# Patient Record
Sex: FEMALE | Race: WHITE
[De-identification: names, ages, dates, MRNs, and addresses within clinical notes are randomized per-mention and may not be internally consistent; named-entity substitution may affect disease eponyms.]

---

## 2018-07-12 ENCOUNTER — RESULT REVIEW (OUTPATIENT)
Age: 23
End: 2018-07-12

## 2018-12-13 PROBLEM — Z00.00 ENCOUNTER FOR PREVENTIVE HEALTH EXAMINATION: Status: ACTIVE | Noted: 2018-12-13

## 2019-07-11 ENCOUNTER — RX RENEWAL (OUTPATIENT)
Age: 24
End: 2019-07-11

## 2019-07-23 ENCOUNTER — APPOINTMENT (OUTPATIENT)
Dept: OBGYN | Facility: CLINIC | Age: 24
End: 2019-07-23

## 2020-02-23 ENCOUNTER — INPATIENT (INPATIENT)
Facility: HOSPITAL | Age: 25
LOS: 3 days | Discharge: HOME | DRG: 386 | End: 2020-02-27
Attending: INTERNAL MEDICINE | Admitting: INTERNAL MEDICINE
Payer: COMMERCIAL

## 2020-02-23 VITALS
OXYGEN SATURATION: 100 % | RESPIRATION RATE: 18 BRPM | HEART RATE: 97 BPM | SYSTOLIC BLOOD PRESSURE: 125 MMHG | WEIGHT: 125 LBS | HEIGHT: 64 IN | DIASTOLIC BLOOD PRESSURE: 82 MMHG | TEMPERATURE: 98 F

## 2020-02-23 DIAGNOSIS — K51.00 ULCERATIVE (CHRONIC) PANCOLITIS WITHOUT COMPLICATIONS: ICD-10-CM

## 2020-02-23 DIAGNOSIS — K62.5 HEMORRHAGE OF ANUS AND RECTUM: ICD-10-CM

## 2020-02-23 DIAGNOSIS — N39.0 URINARY TRACT INFECTION, SITE NOT SPECIFIED: ICD-10-CM

## 2020-02-23 DIAGNOSIS — Z29.9 ENCOUNTER FOR PROPHYLACTIC MEASURES, UNSPECIFIED: ICD-10-CM

## 2020-02-23 DIAGNOSIS — Z98.890 OTHER SPECIFIED POSTPROCEDURAL STATES: Chronic | ICD-10-CM

## 2020-02-23 LAB
ALBUMIN SERPL ELPH-MCNC: 4.1 G/DL — SIGNIFICANT CHANGE UP (ref 3.3–5)
ALP SERPL-CCNC: 65 U/L — SIGNIFICANT CHANGE UP (ref 40–120)
ALT FLD-CCNC: 15 U/L — SIGNIFICANT CHANGE UP (ref 10–45)
ANION GAP SERPL CALC-SCNC: 14 MMOL/L — SIGNIFICANT CHANGE UP (ref 5–17)
APPEARANCE UR: ABNORMAL
APTT BLD: 24.5 SEC — LOW (ref 27.5–36.3)
AST SERPL-CCNC: 13 U/L — SIGNIFICANT CHANGE UP (ref 10–40)
BACTERIA # UR AUTO: NEGATIVE — SIGNIFICANT CHANGE UP
BASOPHILS # BLD AUTO: 0.05 K/UL — SIGNIFICANT CHANGE UP (ref 0–0.2)
BASOPHILS NFR BLD AUTO: 0.7 % — SIGNIFICANT CHANGE UP (ref 0–2)
BILIRUB SERPL-MCNC: 0.2 MG/DL — SIGNIFICANT CHANGE UP (ref 0.2–1.2)
BILIRUB UR-MCNC: NEGATIVE — SIGNIFICANT CHANGE UP
BLD GP AB SCN SERPL QL: NEGATIVE — SIGNIFICANT CHANGE UP
BUN SERPL-MCNC: 12 MG/DL — SIGNIFICANT CHANGE UP (ref 7–23)
CALCIUM SERPL-MCNC: 9.3 MG/DL — SIGNIFICANT CHANGE UP (ref 8.4–10.5)
CHLORIDE SERPL-SCNC: 104 MMOL/L — SIGNIFICANT CHANGE UP (ref 96–108)
CO2 SERPL-SCNC: 22 MMOL/L — SIGNIFICANT CHANGE UP (ref 22–31)
COLOR SPEC: YELLOW — SIGNIFICANT CHANGE UP
CREAT SERPL-MCNC: 0.86 MG/DL — SIGNIFICANT CHANGE UP (ref 0.5–1.3)
DIFF PNL FLD: ABNORMAL
EOSINOPHIL # BLD AUTO: 0.59 K/UL — HIGH (ref 0–0.5)
EOSINOPHIL NFR BLD AUTO: 8.6 % — HIGH (ref 0–6)
EPI CELLS # UR: 7 — SIGNIFICANT CHANGE UP
GLUCOSE SERPL-MCNC: 99 MG/DL — SIGNIFICANT CHANGE UP (ref 70–99)
GLUCOSE UR QL: NEGATIVE — SIGNIFICANT CHANGE UP
HCG SERPL-ACNC: <2 MIU/ML — SIGNIFICANT CHANGE UP
HCT VFR BLD CALC: 43.3 % — SIGNIFICANT CHANGE UP (ref 34.5–45)
HGB BLD-MCNC: 14.3 G/DL — SIGNIFICANT CHANGE UP (ref 11.5–15.5)
HYALINE CASTS # UR AUTO: 1 /LPF — SIGNIFICANT CHANGE UP (ref 0–7)
IMM GRANULOCYTES NFR BLD AUTO: 0.1 % — SIGNIFICANT CHANGE UP (ref 0–1.5)
INR BLD: 1.08 RATIO — SIGNIFICANT CHANGE UP (ref 0.88–1.16)
KETONES UR-MCNC: NEGATIVE — SIGNIFICANT CHANGE UP
LEUKOCYTE ESTERASE UR-ACNC: NEGATIVE — SIGNIFICANT CHANGE UP
LIDOCAIN IGE QN: 276 U/L — HIGH (ref 7–60)
LYMPHOCYTES # BLD AUTO: 2.04 K/UL — SIGNIFICANT CHANGE UP (ref 1–3.3)
LYMPHOCYTES # BLD AUTO: 29.7 % — SIGNIFICANT CHANGE UP (ref 13–44)
MCHC RBC-ENTMCNC: 29.5 PG — SIGNIFICANT CHANGE UP (ref 27–34)
MCHC RBC-ENTMCNC: 33 GM/DL — SIGNIFICANT CHANGE UP (ref 32–36)
MCV RBC AUTO: 89.5 FL — SIGNIFICANT CHANGE UP (ref 80–100)
MONOCYTES # BLD AUTO: 0.97 K/UL — HIGH (ref 0–0.9)
MONOCYTES NFR BLD AUTO: 14.1 % — HIGH (ref 2–14)
NEUTROPHILS # BLD AUTO: 3.21 K/UL — SIGNIFICANT CHANGE UP (ref 1.8–7.4)
NEUTROPHILS NFR BLD AUTO: 46.8 % — SIGNIFICANT CHANGE UP (ref 43–77)
NITRITE UR-MCNC: NEGATIVE — SIGNIFICANT CHANGE UP
NRBC # BLD: 0 /100 WBCS — SIGNIFICANT CHANGE UP (ref 0–0)
PH UR: 6 — SIGNIFICANT CHANGE UP (ref 5–8)
PLATELET # BLD AUTO: 276 K/UL — SIGNIFICANT CHANGE UP (ref 150–400)
POTASSIUM SERPL-MCNC: 3.8 MMOL/L — SIGNIFICANT CHANGE UP (ref 3.5–5.3)
POTASSIUM SERPL-SCNC: 3.8 MMOL/L — SIGNIFICANT CHANGE UP (ref 3.5–5.3)
PROT SERPL-MCNC: 7.2 G/DL — SIGNIFICANT CHANGE UP (ref 6–8.3)
PROT UR-MCNC: ABNORMAL
PROTHROM AB SERPL-ACNC: 12.4 SEC — SIGNIFICANT CHANGE UP (ref 10–12.9)
RBC # BLD: 4.84 M/UL — SIGNIFICANT CHANGE UP (ref 3.8–5.2)
RBC # FLD: 12.8 % — SIGNIFICANT CHANGE UP (ref 10.3–14.5)
RBC CASTS # UR COMP ASSIST: 2 /HPF — SIGNIFICANT CHANGE UP (ref 0–4)
RH IG SCN BLD-IMP: POSITIVE — SIGNIFICANT CHANGE UP
SODIUM SERPL-SCNC: 140 MMOL/L — SIGNIFICANT CHANGE UP (ref 135–145)
SP GR SPEC: 1.03 — HIGH (ref 1.01–1.02)
UROBILINOGEN FLD QL: NEGATIVE — SIGNIFICANT CHANGE UP
WBC # BLD: 6.87 K/UL — SIGNIFICANT CHANGE UP (ref 3.8–10.5)
WBC # FLD AUTO: 6.87 K/UL — SIGNIFICANT CHANGE UP (ref 3.8–10.5)
WBC UR QL: 1 /HPF — SIGNIFICANT CHANGE UP (ref 0–5)

## 2020-02-23 PROCEDURE — 74177 CT ABD & PELVIS W/CONTRAST: CPT | Mod: 26

## 2020-02-23 PROCEDURE — 99285 EMERGENCY DEPT VISIT HI MDM: CPT

## 2020-02-23 PROCEDURE — 99223 1ST HOSP IP/OBS HIGH 75: CPT

## 2020-02-23 RX ORDER — SODIUM CHLORIDE 9 MG/ML
1000 INJECTION INTRAMUSCULAR; INTRAVENOUS; SUBCUTANEOUS ONCE
Refills: 0 | Status: COMPLETED | OUTPATIENT
Start: 2020-02-23 | End: 2020-02-23

## 2020-02-23 RX ORDER — CIPROFLOXACIN LACTATE 400MG/40ML
400 VIAL (ML) INTRAVENOUS ONCE
Refills: 0 | Status: COMPLETED | OUTPATIENT
Start: 2020-02-23 | End: 2020-02-23

## 2020-02-23 RX ORDER — METRONIDAZOLE 500 MG
500 TABLET ORAL ONCE
Refills: 0 | Status: COMPLETED | OUTPATIENT
Start: 2020-02-23 | End: 2020-02-24

## 2020-02-23 RX ORDER — CIPROFLOXACIN LACTATE 400MG/40ML
VIAL (ML) INTRAVENOUS
Refills: 0 | Status: DISCONTINUED | OUTPATIENT
Start: 2020-02-23 | End: 2020-02-24

## 2020-02-23 RX ORDER — CIPROFLOXACIN LACTATE 400MG/40ML
400 VIAL (ML) INTRAVENOUS EVERY 12 HOURS
Refills: 0 | Status: DISCONTINUED | OUTPATIENT
Start: 2020-02-24 | End: 2020-02-24

## 2020-02-23 RX ORDER — SODIUM CHLORIDE 9 MG/ML
1000 INJECTION INTRAMUSCULAR; INTRAVENOUS; SUBCUTANEOUS
Refills: 0 | Status: DISCONTINUED | OUTPATIENT
Start: 2020-02-23 | End: 2020-02-25

## 2020-02-23 RX ORDER — METRONIDAZOLE 500 MG
500 TABLET ORAL EVERY 8 HOURS
Refills: 0 | Status: DISCONTINUED | OUTPATIENT
Start: 2020-02-24 | End: 2020-02-24

## 2020-02-23 RX ORDER — METRONIDAZOLE 500 MG
TABLET ORAL
Refills: 0 | Status: DISCONTINUED | OUTPATIENT
Start: 2020-02-24 | End: 2020-02-24

## 2020-02-23 RX ADMIN — Medication 200 MILLIGRAM(S): at 22:57

## 2020-02-23 RX ADMIN — SODIUM CHLORIDE 1000 MILLILITER(S): 9 INJECTION INTRAMUSCULAR; INTRAVENOUS; SUBCUTANEOUS at 20:19

## 2020-02-23 RX ADMIN — SODIUM CHLORIDE 1000 MILLILITER(S): 9 INJECTION INTRAMUSCULAR; INTRAVENOUS; SUBCUTANEOUS at 20:18

## 2020-02-23 RX ADMIN — SODIUM CHLORIDE 1000 MILLILITER(S): 9 INJECTION INTRAMUSCULAR; INTRAVENOUS; SUBCUTANEOUS at 17:36

## 2020-02-23 RX ADMIN — SODIUM CHLORIDE 100 MILLILITER(S): 9 INJECTION INTRAMUSCULAR; INTRAVENOUS; SUBCUTANEOUS at 22:58

## 2020-02-23 RX ADMIN — SODIUM CHLORIDE 1000 MILLILITER(S): 9 INJECTION INTRAMUSCULAR; INTRAVENOUS; SUBCUTANEOUS at 21:48

## 2020-02-23 NOTE — ED PROVIDER NOTE - OBJECTIVE STATEMENT
24 year old female with no pmhx presents to the ED c/o rectal bleeding x1 month worsening over the past week with accompanying abdominal pain. +weakness, chills. Fmhx UC, pt referred to [facility] today for evaluation s/p referral by Dr. Campbell and Dr. Frost to determine whether she has UC. Notes increased frequency of BM over the past several weeks. Also notes she has recurrent UTIs and suspects that she may also have a UTI currently. Denies fever, rash. Currently on birth control.

## 2020-02-23 NOTE — H&P ADULT - NSHPREVIEWOFSYSTEMS_GEN_ALL_CORE
CONSTITUTIONAL: No weakness, fevers or chills  EYES/ENT: No visual changes;  No dysphagia  NECK: No pain or stiffness  RESPIRATORY: No cough, wheezing, hemoptysis; No shortness of breath  CARDIOVASCULAR: No chest pain or palpitations; No lower extremity edema  EXTREMITIES: no le edema, cyanosis, clubbing  MUSCULOSKELETAL: no joint pain, swelling  GASTROINTESTINAL: +abdominal pain, +bloody diarrhea  BACK: No back pain  GENITOURINARY: +dysuria  NEUROLOGICAL: No numbness or weakness  SKIN: No itching, burning, rashes, or lesions   PSYCH: no agitation  All other review of systems is negative unless indicated above.

## 2020-02-23 NOTE — H&P ADULT - NSICDXFAMILYHX_GEN_ALL_CORE_FT
FAMILY HISTORY:  Family history of ulcerative colitis, father and paternal grand mother  FH: heart disease, father

## 2020-02-23 NOTE — ED PROVIDER NOTE - PROGRESS NOTE DETAILS
Spoke with Dr Campbell. To be seen by Dr Rodriguez. Callback once CT and Labs result Patient signed out to me by the prior attending.  The patient's disposition was discussed with the treating team and agreed upon.  Last Shannon M.D. (attending) patient with pancolitis will admit to inpatient  Dr. Campbell received call at this time and accepts patient  Based on patient's history and physical exam, as well as the results of today's workup, I feel that patient warrants admission to the hospital for further workup/evaluation and continued management. I discussed the findings of today's workup with the patient and addressed the patient's questions and concerns. The patient was agreeable with admission. Our team spoke with the inpatient receiving team who accepted the patient for admission and subsequently took over the patient's care at the time of admission.

## 2020-02-23 NOTE — ED PROVIDER NOTE - ATTENDING CONTRIBUTION TO CARE
Bety Avelar MD - Attending Physician: I have personally seen and examined this patient with the resident/fellow.  I have fully participated in the care of this patient. I have reviewed all pertinent clinical information, including history, physical exam, plan and the Resident/Fellow’s note and agree except as noted. See MDM

## 2020-02-23 NOTE — H&P ADULT - HISTORY OF PRESENT ILLNESS
24 year old female with no pmhx presents to the ED c/o rectal bleeding x1 month worsening over the past week with accompanying abdominal pain. +weakness, chills. Fmhx UC, pt referred to [facility] today for evaluation s/p referral by Dr. Campbell and Dr. Frost to determine whether she has UC. Notes increased frequency of BM over the past several weeks. Also notes she has recurrent UTIs and suspects that she may also have a UTI currently. Denies fever, rash. Currently on birth control. 23 yo female with PMH of osteomyelitis of left ankle, presents here with one month of rectal bleeding.  Patient states about 1 month ago, patient started having dark stook and 2 weeks ago started having BRBPR.  Patient states she has been having multiple episodes of bloody stool, recently she has been having fecal urgency and running to bathroom every hour, on average having >10 episodes of bloody stool per day.  She also has associated with abdominal pain but denies fever, chills, nausea, vomiting.  She has been feeling hot flushes.  She is also currently being treated for UTI with nitrofurontoin (completed 3 days).  Patient's father and paternal grandmother were diagnosed with ulcerative colitis. 25 yo female with PMH of osteomyelitis (?MRSA) of left ankle, presents here with one month of rectal bleeding.  Patient states about 1 month ago, patient started having dark stook and 2 weeks ago started having BRBPR.  Patient states she has been having multiple episodes of bloody stool, recently she has been having fecal urgency and running to bathroom every hour, on average having >10 episodes of bloody stool per day.  She also has associated with abdominal pain but denies fever, chills, nausea, vomiting.  She has been feeling hot flushes.  She is also currently being treated for UTI with nitrofurontoin (completed 3 days).  Patient's father and paternal grandmother were diagnosed with ulcerative colitis.

## 2020-02-23 NOTE — H&P ADULT - PROBLEM SELECTOR PLAN 2
Given evidence of pancolitis, >10 episodes of diarrhea and fecal urgency, will start patient on IV abx for now  check stool culture, ova, parasites, c. diff, GI PCR  keep NPO for now  check lactate  GI consult in AM

## 2020-02-23 NOTE — ED PROVIDER NOTE - PHYSICAL EXAMINATION
General: No acute distress.  Heart: Regular rate and rhythm. No murmurs, rubs, or gallops.  Lungs: CTAB, no wheezes or rhonchi.  Abdomen: Mild non-specific tenderness, no rebound or distension.   Eyes: PERRL, EOMI.  Neuro: AAOx4, no focal motor or sensory deficits. CN II-XII intact.  Extremities: No lower extremity swelling, 2+ DP and radial pulses.  Skin: No rashes or lesions.

## 2020-02-23 NOTE — H&P ADULT - NSHPLABSRESULTS_GEN_ALL_CORE
Labs personally reviewed:                          14.3   6.87  )-----------( 276      ( 2020 17:41 )             43.3     -    140  |  104  |  12  ----------------------------<  99  3.8   |  22  |  0.86    Ca    9.3      2020 17:41    TPro  7.2  /  Alb  4.1  /  TBili  0.2  /  DBili  x   /  AST  13  /  ALT  15  /  AlkPhos  65  -        LIVER FUNCTIONS - ( 2020 17:41 )  Alb: 4.1 g/dL / Pro: 7.2 g/dL / ALK PHOS: 65 U/L / ALT: 15 U/L / AST: 13 U/L / GGT: x           PT/INR - ( 2020 17:41 )   PT: 12.4 sec;   INR: 1.08 ratio         PTT - ( 2020 17:41 )  PTT:24.5 sec  Urinalysis Basic - ( 2020 17:41 )    Color: Yellow / Appearance: Slightly Turbid / S.032 / pH: x  Gluc: x / Ketone: Negative  / Bili: Negative / Urobili: Negative   Blood: x / Protein: 30 mg/dL / Nitrite: Negative   Leuk Esterase: Negative / RBC: 2 /hpf / WBC 1 /HPF   Sq Epi: x / Non Sq Epi: 7 / Bacteria: Negative      CAPILLARY BLOOD GLUCOSE          Imaging:  CT abd/pelv reviewed:  IMPRESSION:     Diffuse pancolitis of infectious or inflammatory etiology.  No bowel obstruction.      EKG ordered

## 2020-02-23 NOTE — H&P ADULT - PROBLEM SELECTOR PLAN 1
Patient with rectal bleeding >10 episodes/day  hemodynamically stable  Hb stable  c/w IVF  monitor H+H  keep NPO for now  GI consult in am  stool work up

## 2020-02-23 NOTE — ED PROVIDER NOTE - CLINICAL SUMMARY MEDICAL DECISION MAKING FREE TEXT BOX
24 year old female with no pmhx presents to the ED c/o rectal bleeding, recently developed abdominal pain. Likely suspect UC given fmhx. Will conduct labs, imaging, GI consult then likely admit. Bety Avelar MD - Attending Physician: 24 year old female with no pmhx presents to the ED c/o rectal bleeding, recently developed abdominal pain. Likely suspect UC given fmhx. Will conduct labs, imaging, GI consult then likely admit.

## 2020-02-23 NOTE — H&P ADULT - ASSESSMENT
25 yo female with PMH of osteomyelitis (?MRSA) of left ankle, presents here with one month of rectal bleeding

## 2020-02-23 NOTE — ED PROVIDER NOTE - NS_ ATTENDINGSCRIBEDETAILS _ED_A_ED_FT
Bety Avelar MD - Attending Physician: The scribe's documentation has been prepared under my direction and personally reviewed by me in its entirety. I confirm that the note above accurately reflects all work, treatment, procedures, and medical decision making performed by me.

## 2020-02-23 NOTE — H&P ADULT - PROBLEM SELECTOR PLAN 3
completed 3 days of nitrofurontoin, currently still symptomatic  on cipro for colitis, which will continue

## 2020-02-23 NOTE — H&P ADULT - NSHPPHYSICALEXAM_GEN_ALL_CORE
PHYSICAL EXAM:  Vital Signs Last 24 Hrs  T(C): 37.1 (02-23-20 @ 20:15)  T(F): 98.8 (02-23-20 @ 20:15), Max: 98.8 (02-23-20 @ 20:15)  HR: 85 (02-23-20 @ 20:15) (85 - 97)  BP: 116/75 (02-23-20 @ 20:15)  BP(mean): --  RR: 16 (02-23-20 @ 20:15) (16 - 18)  SpO2: 98% (02-23-20 @ 20:15) (98% - 100%)  Wt(kg): --    Constitutional: NAD, awake and alert  EYES: EOMI  ENMT:  Normal Hearing, no tonsillar exudates ; dry mucous membrane  Neck: Soft and supple, No JVD  Lungs: Breath sounds are clear bilaterally, No wheezing, rales or rhonchi  Heart: S1 and S2, regular rate and rhythm, no Murmurs, gallops or rubs  Abdomen: Bowel Sounds present, soft, nondistended, +voluntary guarding, +tenderness diffusely  Extremities: No cyanosis or clubbing; warm to touch  Vascular: 2+ peripheral pulses lower ex  Neurological: A/O x 3, no focal deficits  Musculoskeletal: 5/5 strength b/l upper and lower extremities  Skin: No rashes  Psych: no depression or anhedonia  HEME: no bruises, no nose bleeds

## 2020-02-24 ENCOUNTER — RESULT REVIEW (OUTPATIENT)
Age: 25
End: 2020-02-24

## 2020-02-24 LAB
C DIFF GDH STL QL: NEGATIVE — SIGNIFICANT CHANGE UP
C DIFF GDH STL QL: SIGNIFICANT CHANGE UP
CRP SERPL-MCNC: 1.2 MG/DL — HIGH (ref 0–0.4)
CULTURE RESULTS: SIGNIFICANT CHANGE UP
ERYTHROCYTE [SEDIMENTATION RATE] IN BLOOD: 7 MM/HR — SIGNIFICANT CHANGE UP (ref 0–15)
HCT VFR BLD CALC: 29.7 % — LOW (ref 34.5–45)
HCT VFR BLD CALC: 36.7 % — SIGNIFICANT CHANGE UP (ref 34.5–45)
HCT VFR BLD CALC: 38.9 % — SIGNIFICANT CHANGE UP (ref 34.5–45)
HGB BLD-MCNC: 11.8 G/DL — SIGNIFICANT CHANGE UP (ref 11.5–15.5)
HGB BLD-MCNC: 12.7 G/DL — SIGNIFICANT CHANGE UP (ref 11.5–15.5)
HGB BLD-MCNC: 9.4 G/DL — LOW (ref 11.5–15.5)
LACTATE BLDV-MCNC: 1.8 MMOL/L — SIGNIFICANT CHANGE UP (ref 0.7–2)
MCHC RBC-ENTMCNC: 29 PG — SIGNIFICANT CHANGE UP (ref 27–34)
MCHC RBC-ENTMCNC: 29.1 PG — SIGNIFICANT CHANGE UP (ref 27–34)
MCHC RBC-ENTMCNC: 29.6 PG — SIGNIFICANT CHANGE UP (ref 27–34)
MCHC RBC-ENTMCNC: 31.6 GM/DL — LOW (ref 32–36)
MCHC RBC-ENTMCNC: 32.2 GM/DL — SIGNIFICANT CHANGE UP (ref 32–36)
MCHC RBC-ENTMCNC: 32.6 GM/DL — SIGNIFICANT CHANGE UP (ref 32–36)
MCV RBC AUTO: 90.6 FL — SIGNIFICANT CHANGE UP (ref 80–100)
MCV RBC AUTO: 90.7 FL — SIGNIFICANT CHANGE UP (ref 80–100)
MCV RBC AUTO: 91.7 FL — SIGNIFICANT CHANGE UP (ref 80–100)
NRBC # BLD: 0 /100 WBCS — SIGNIFICANT CHANGE UP (ref 0–0)
PLATELET # BLD AUTO: 145 K/UL — LOW (ref 150–400)
PLATELET # BLD AUTO: 215 K/UL — SIGNIFICANT CHANGE UP (ref 150–400)
PLATELET # BLD AUTO: 237 K/UL — SIGNIFICANT CHANGE UP (ref 150–400)
RBC # BLD: 3.24 M/UL — LOW (ref 3.8–5.2)
RBC # BLD: 4.05 M/UL — SIGNIFICANT CHANGE UP (ref 3.8–5.2)
RBC # BLD: 4.29 M/UL — SIGNIFICANT CHANGE UP (ref 3.8–5.2)
RBC # FLD: 12.8 % — SIGNIFICANT CHANGE UP (ref 10.3–14.5)
RBC # FLD: 13 % — SIGNIFICANT CHANGE UP (ref 10.3–14.5)
SPECIMEN SOURCE: SIGNIFICANT CHANGE UP
WBC # BLD: 5.1 K/UL — SIGNIFICANT CHANGE UP (ref 3.8–10.5)
WBC # BLD: 6.07 K/UL — SIGNIFICANT CHANGE UP (ref 3.8–10.5)
WBC # BLD: 7.43 K/UL — SIGNIFICANT CHANGE UP (ref 3.8–10.5)
WBC # FLD AUTO: 5.1 K/UL — SIGNIFICANT CHANGE UP (ref 3.8–10.5)
WBC # FLD AUTO: 6.07 K/UL — SIGNIFICANT CHANGE UP (ref 3.8–10.5)
WBC # FLD AUTO: 7.43 K/UL — SIGNIFICANT CHANGE UP (ref 3.8–10.5)

## 2020-02-24 PROCEDURE — 99222 1ST HOSP IP/OBS MODERATE 55: CPT

## 2020-02-24 PROCEDURE — 88305 TISSUE EXAM BY PATHOLOGIST: CPT | Mod: 26

## 2020-02-24 RX ORDER — ACETAMINOPHEN 500 MG
1000 TABLET ORAL ONCE
Refills: 0 | Status: COMPLETED | OUTPATIENT
Start: 2020-02-24 | End: 2020-02-24

## 2020-02-24 RX ADMIN — Medication 100 MILLIGRAM(S): at 00:33

## 2020-02-24 RX ADMIN — Medication 1000 MILLIGRAM(S): at 18:49

## 2020-02-24 RX ADMIN — Medication 400 MILLIGRAM(S): at 16:02

## 2020-02-24 RX ADMIN — SODIUM CHLORIDE 100 MILLILITER(S): 9 INJECTION INTRAMUSCULAR; INTRAVENOUS; SUBCUTANEOUS at 13:48

## 2020-02-24 RX ADMIN — Medication 20 MILLIGRAM(S): at 21:04

## 2020-02-24 RX ADMIN — SODIUM CHLORIDE 100 MILLILITER(S): 9 INJECTION INTRAMUSCULAR; INTRAVENOUS; SUBCUTANEOUS at 19:00

## 2020-02-24 NOTE — CONSULT NOTE ADULT - SUBJECTIVE AND OBJECTIVE BOX
Patient is a 24y old  Female who presents with a chief complaint of rectal bleeding (2020 09:00)      HPI:  25 yo female with PMH of osteomyelitis (?MRSA) of left ankle, presents here with one month of rectal bleeding.  Patient states about 1 month ago, patient started having dark stook and 2 weeks ago started having BRBPR.  Patient states she has been having multiple episodes of bloody stool, recently she has been having fecal urgency and running to bathroom every hour, on average having >10 episodes of bloody stool per day.  She also has associated with abdominal pain but denies fever, chills, nausea, vomiting.  She has been feeling hot flushes.  She is also currently being treated for UTI with nitrofurontoin (completed 3 days).  Patient's father and paternal grandmother were diagnosed with ulcerative colitis. (2020 21:52)        PAST MEDICAL & SURGICAL HISTORY:  Recurrent UTI (urinary tract infection)  H/O osteomyelitis: ?MRSA  History of ankle surgery      Allergies    Bactrim (Rash)    Intolerances        MEDICATIONS  (STANDING):  sodium chloride 0.9%. 1000 milliLiter(s) (100 mL/Hr) IV Continuous <Continuous>    MEDICATIONS  (PRN):      Review of Systems:  no fever or chills.  No chest pain.  No myalgia or arthralgia.  No chest pain.  No weight loss.    Vital Signs Last 24 Hrs  T(C): 37 (2020 06:11), Max: 37.1 (2020 20:15)  T(F): 98.6 (2020 06:11), Max: 98.8 (2020 20:15)  HR: 69 (2020 06:11) (66 - 97)  BP: 116/76 (2020 06:11) (116/75 - 125/82)  BP(mean): --  RR: 16 (2020 06:11) (16 - 18)  SpO2: 97% (2020 06:11) (97% - 100%)    PHYSICAL EXAM:  Constitutional: NAD, well-developed  Neck: No LAD, supple  Respiratory: CTA and P  Cardiovascular: S1 and S2, RRR, no M  Gastrointestinal: BS+, soft, NT/ND, neg HSM,  Extremities: No peripheral edema, neg clubing, cyanosis  Vascular: 2+ peripheral pulses  Neurological: A/O x 3, no focal deficits  Psychiatric: Normal mood, normal affect  Skin: No rashes    LABS:                        11.8   6.07  )-----------( 215      ( 2020 09:21 )             36.7     02-23    140  |  104  |  12  ----------------------------<  99  3.8   |  22  |  0.86    Ca    9.3      2020 17:41    TPro  7.2  /  Alb  4.1  /  TBili  0.2  /  DBili  x   /  AST  13  /  ALT  15  /  AlkPhos  65  02-23    PT/INR - ( 2020 17:41 )   PT: 12.4 sec;   INR: 1.08 ratio         PTT - ( 2020 17:41 )  PTT:24.5 sec  Urinalysis Basic - ( 2020 17:41 )    Color: Yellow / Appearance: Slightly Turbid / S.032 / pH: x  Gluc: x / Ketone: Negative  / Bili: Negative / Urobili: Negative   Blood: x / Protein: 30 mg/dL / Nitrite: Negative   Leuk Esterase: Negative / RBC: 2 /hpf / WBC 1 /HPF   Sq Epi: x / Non Sq Epi: 7 / Bacteria: Negative      LIVER FUNCTIONS - ( 2020 17:41 )  Alb: 4.1 g/dL / Pro: 7.2 g/dL / ALK PHOS: 65 U/L / ALT: 15 U/L / AST: 13 U/L / GGT: x             RADIOLOGY & ADDITIONAL TESTS:

## 2020-02-24 NOTE — CONSULT NOTE ADULT - ASSESSMENT
In summary, young female with strong family history of UC, with 1 month of colitis symptoms, without use of antibiotics or travel history.  Suspect UC.    Plan:    Flex sig today. (can have clear liquids till noon since flex sig later today, enema x 2 around 2 pm)  stool for GI pCR (pending)  Continue IV cipro/flagyl for now  Pending results of flex sig, will likely start IV steroids    Alfonso Rodriguez MD

## 2020-02-24 NOTE — CONSULT NOTE ADULT - ASSESSMENT
24 year old in good health with history of UTIs  pt with one month of bloody diarrhea, no fevr or chills  no travel in last 8 months.  on ab intermittently or acne and UTIs ,  Family history of UC    non toxic  exam is very benign  hungry    history is not suggestive of infectious diarrhea    await stools PCR  ameba serology  hold ab  no symptoms of active UTI   needs colonoscopy .

## 2020-02-24 NOTE — CHART NOTE - NSCHARTNOTEFT_GEN_A_CORE
Colonoscopy performed.  See note.    Pan colitis.  Stool for c. diff toxin negative.  GI PCR negative.  Diarrhea x 1 month. +FH of UC.    Suspect UC.    Initiated methylprednisolone 20 mg iV  q 8 for now  Full liquids  Can advance to low residue tomorrow if tolerates full liquids    Alfonso Rodriguez MD

## 2020-02-24 NOTE — ED ADULT NURSE REASSESSMENT NOTE - NS ED NURSE REASSESS COMMENT FT1
Admitting medicine PA at bedside to speak with pt and family regarding plan of care. Pt and family understand NPO status of pt currently. Maintenance IV infusion continued.

## 2020-02-24 NOTE — CONSULT NOTE ADULT - SUBJECTIVE AND OBJECTIVE BOX
Patient is a 24y old  Female who presents with a chief complaint of rectal bleeding (23 Feb 2020 21:52)    HPI:  25 yo female with PMH of osteomyelitis (?MRSA) of left ankle, presents here with one month of rectal bleeding.  Patient states about 1 month ago, patient started having dark stook and 2 weeks ago started having BRBPR.  Patient states she has been having multiple episodes of bloody stool, recently she has been having fecal urgency and running to bathroom every hour, on average having >10 episodes of bloody stool per day.  She also has associated with abdominal pain but denies fever, chills, nausea, vomiting.  She has been feeling hot flushes.  She is also currently being treated for UTI with nitrofurontoin (completed 3 days).  Patient's father and paternal grandmother were diagnosed with ulcerative colitis. (23 Feb 2020 21:52)      PAST MEDICAL & SURGICAL HISTORY:  Recurrent UTI (urinary tract infection)  H/O osteomyelitis: ?MRSA  History of ankle surgery      Social history:    FAMILY HISTORY:  FH: heart disease: father  Family history of ulcerative colitis: father and paternal grand mother    REVIEW OF SYSTEMS  General:	Denies any malaise fatigue or chills. Fevers absent    Skin:No rash  	  Ophthalmologic:Denies any visual complaints,discharge redness or photophobia  	  ENMT:No nasal discharge,headache,sinus congestion or throat pain.No dental complaints    Respiratory and Thorax:No cough,sputum or chest pain.Denies shortness of breath  	  Cardiovascular:	No chest pain,palpitaions or dizziness    Gastrointestinal:	NO nausea,abdominal pain or diarrhea.    Genitourinary:	No dysuria,frequency. No flank pain    Musculoskeletal:	No joint swelling or pain.No weakness    Neurological:No confusion,diziness.No extremity weakness.No bladder or bowel incontinence	    Psychiatric:No delusions or hallucinations	    Hematology/Lymphatics:	No LN swelling.No gum bleeding     Endocrine:	No recent weight gain or loss.No abnormal heat/cold intolerance    Allergic/Immunologic:	No hives or rash   Allergies    Bactrim (Rash)    Intolerances        Antimicrobials:          Vital Signs Last 24 Hrs  T(C): 37 (24 Feb 2020 06:11), Max: 37.1 (23 Feb 2020 20:15)  T(F): 98.6 (24 Feb 2020 06:11), Max: 98.8 (23 Feb 2020 20:15)  HR: 69 (24 Feb 2020 06:11) (66 - 97)  BP: 116/76 (24 Feb 2020 06:11) (116/75 - 125/82)  BP(mean): --  RR: 16 (24 Feb 2020 06:11) (16 - 18)  SpO2: 97% (24 Feb 2020 06:11) (97% - 100%)    PHYSICAL EXAM:Pleasant patient in no acute distress.      Constitutional:Comfortable.Awake and alert  No cachexia     Eyes:PERRL EOMI.NO discharge or conjunctival injection    ENMT:No sinus tenderness.No thrush.No pharyngeal exudate or erythema.Fair dental hygiene    Neck:Supple,No LN,no JVD      Respiratory:Good air entry bilaterally,CTA    Cardiovascular:S1 S2 wnl, No murmurs,rub or gallops    Gastrointestinal:Soft BS(+) no tenderness no masses ,No rebound or guarding    Genitourinary:No CVA tendereness     Rectal:    Extremities:No cyanosis,clubbing or edema.    Vascular:peripheral pulses felt    Neurological:AAO X 3,No grossly focal deficits    Skin:No rash     Lymph Nodes:No palpable LNs    Musculoskeletal:No joint swelling or LOM    Psychiatric:Affect normal.                                12.7   7.43  )-----------( 237      ( 24 Feb 2020 00:51 )             38.9         02-23    140  |  104  |  12  ----------------------------<  99  3.8   |  22  |  0.86    Ca    9.3      23 Feb 2020 17:41    TPro  7.2  /  Alb  4.1  /  TBili  0.2  /  DBili  x   /  AST  13  /  ALT  15  /  AlkPhos  65  02-23      RECENT CULTURES:      MICROBIOLOGY:          Radiology:      Assessment:        Recommendations and Plan:    Pager [phone]  After 5 pm/weekends or if no response :[phone]

## 2020-02-25 LAB
ANION GAP SERPL CALC-SCNC: 10 MMOL/L — SIGNIFICANT CHANGE UP (ref 5–17)
BUN SERPL-MCNC: 5 MG/DL — LOW (ref 7–23)
CALCIUM SERPL-MCNC: 8.4 MG/DL — SIGNIFICANT CHANGE UP (ref 8.4–10.5)
CHLORIDE SERPL-SCNC: 106 MMOL/L — SIGNIFICANT CHANGE UP (ref 96–108)
CO2 SERPL-SCNC: 21 MMOL/L — LOW (ref 22–31)
CREAT SERPL-MCNC: 0.66 MG/DL — SIGNIFICANT CHANGE UP (ref 0.5–1.3)
CULTURE RESULTS: SIGNIFICANT CHANGE UP
GLUCOSE SERPL-MCNC: 118 MG/DL — HIGH (ref 70–99)
HCT VFR BLD CALC: 37.9 % — SIGNIFICANT CHANGE UP (ref 34.5–45)
HCT VFR BLD CALC: 39.3 % — SIGNIFICANT CHANGE UP (ref 34.5–45)
HGB BLD-MCNC: 11.9 G/DL — SIGNIFICANT CHANGE UP (ref 11.5–15.5)
HGB BLD-MCNC: 13 G/DL — SIGNIFICANT CHANGE UP (ref 11.5–15.5)
MCHC RBC-ENTMCNC: 28.5 PG — SIGNIFICANT CHANGE UP (ref 27–34)
MCHC RBC-ENTMCNC: 29.3 PG — SIGNIFICANT CHANGE UP (ref 27–34)
MCHC RBC-ENTMCNC: 31.4 GM/DL — LOW (ref 32–36)
MCHC RBC-ENTMCNC: 33.1 GM/DL — SIGNIFICANT CHANGE UP (ref 32–36)
MCV RBC AUTO: 88.7 FL — SIGNIFICANT CHANGE UP (ref 80–100)
MCV RBC AUTO: 90.9 FL — SIGNIFICANT CHANGE UP (ref 80–100)
NRBC # BLD: 0 /100 WBCS — SIGNIFICANT CHANGE UP (ref 0–0)
PLATELET # BLD AUTO: 235 K/UL — SIGNIFICANT CHANGE UP (ref 150–400)
PLATELET # BLD AUTO: 285 K/UL — SIGNIFICANT CHANGE UP (ref 150–400)
POTASSIUM SERPL-MCNC: 4.1 MMOL/L — SIGNIFICANT CHANGE UP (ref 3.5–5.3)
POTASSIUM SERPL-SCNC: 4.1 MMOL/L — SIGNIFICANT CHANGE UP (ref 3.5–5.3)
RBC # BLD: 4.17 M/UL — SIGNIFICANT CHANGE UP (ref 3.8–5.2)
RBC # BLD: 4.43 M/UL — SIGNIFICANT CHANGE UP (ref 3.8–5.2)
RBC # FLD: 12.3 % — SIGNIFICANT CHANGE UP (ref 10.3–14.5)
RBC # FLD: 12.6 % — SIGNIFICANT CHANGE UP (ref 10.3–14.5)
SODIUM SERPL-SCNC: 137 MMOL/L — SIGNIFICANT CHANGE UP (ref 135–145)
SPECIMEN SOURCE: SIGNIFICANT CHANGE UP
WBC # BLD: 3.29 K/UL — LOW (ref 3.8–10.5)
WBC # BLD: 6.21 K/UL — SIGNIFICANT CHANGE UP (ref 3.8–10.5)
WBC # FLD AUTO: 3.29 K/UL — LOW (ref 3.8–10.5)
WBC # FLD AUTO: 6.21 K/UL — SIGNIFICANT CHANGE UP (ref 3.8–10.5)

## 2020-02-25 RX ORDER — OXYCODONE AND ACETAMINOPHEN 5; 325 MG/1; MG/1
1 TABLET ORAL ONCE
Refills: 0 | Status: DISCONTINUED | OUTPATIENT
Start: 2020-02-25 | End: 2020-02-25

## 2020-02-25 RX ORDER — ACETAMINOPHEN 500 MG
1000 TABLET ORAL ONCE
Refills: 0 | Status: COMPLETED | OUTPATIENT
Start: 2020-02-25 | End: 2020-02-25

## 2020-02-25 RX ADMIN — Medication 20 MILLIGRAM(S): at 05:09

## 2020-02-25 RX ADMIN — OXYCODONE AND ACETAMINOPHEN 1 TABLET(S): 5; 325 TABLET ORAL at 21:06

## 2020-02-25 RX ADMIN — Medication 1000 MILLIGRAM(S): at 19:05

## 2020-02-25 RX ADMIN — OXYCODONE AND ACETAMINOPHEN 1 TABLET(S): 5; 325 TABLET ORAL at 20:36

## 2020-02-25 RX ADMIN — Medication 20 MILLIGRAM(S): at 13:07

## 2020-02-25 RX ADMIN — Medication 400 MILLIGRAM(S): at 18:35

## 2020-02-25 RX ADMIN — Medication 20 MILLIGRAM(S): at 21:30

## 2020-02-26 LAB
ANION GAP SERPL CALC-SCNC: 13 MMOL/L — SIGNIFICANT CHANGE UP (ref 5–17)
BUN SERPL-MCNC: 12 MG/DL — SIGNIFICANT CHANGE UP (ref 7–23)
CALCIUM SERPL-MCNC: 8.9 MG/DL — SIGNIFICANT CHANGE UP (ref 8.4–10.5)
CHLORIDE SERPL-SCNC: 104 MMOL/L — SIGNIFICANT CHANGE UP (ref 96–108)
CO2 SERPL-SCNC: 22 MMOL/L — SIGNIFICANT CHANGE UP (ref 22–31)
CREAT SERPL-MCNC: 0.69 MG/DL — SIGNIFICANT CHANGE UP (ref 0.5–1.3)
CULTURE RESULTS: SIGNIFICANT CHANGE UP
GLUCOSE SERPL-MCNC: 134 MG/DL — HIGH (ref 70–99)
HCT VFR BLD CALC: 36.2 % — SIGNIFICANT CHANGE UP (ref 34.5–45)
HGB BLD-MCNC: 11.7 G/DL — SIGNIFICANT CHANGE UP (ref 11.5–15.5)
MCHC RBC-ENTMCNC: 29 PG — SIGNIFICANT CHANGE UP (ref 27–34)
MCHC RBC-ENTMCNC: 32.3 GM/DL — SIGNIFICANT CHANGE UP (ref 32–36)
MCV RBC AUTO: 89.6 FL — SIGNIFICANT CHANGE UP (ref 80–100)
NRBC # BLD: 0 /100 WBCS — SIGNIFICANT CHANGE UP (ref 0–0)
PLATELET # BLD AUTO: 285 K/UL — SIGNIFICANT CHANGE UP (ref 150–400)
POTASSIUM SERPL-MCNC: 4.3 MMOL/L — SIGNIFICANT CHANGE UP (ref 3.5–5.3)
POTASSIUM SERPL-SCNC: 4.3 MMOL/L — SIGNIFICANT CHANGE UP (ref 3.5–5.3)
RBC # BLD: 4.04 M/UL — SIGNIFICANT CHANGE UP (ref 3.8–5.2)
RBC # FLD: 12.4 % — SIGNIFICANT CHANGE UP (ref 10.3–14.5)
SODIUM SERPL-SCNC: 139 MMOL/L — SIGNIFICANT CHANGE UP (ref 135–145)
SPECIMEN SOURCE: SIGNIFICANT CHANGE UP
SURGICAL PATHOLOGY STUDY: SIGNIFICANT CHANGE UP
WBC # BLD: 7.66 K/UL — SIGNIFICANT CHANGE UP (ref 3.8–10.5)
WBC # FLD AUTO: 7.66 K/UL — SIGNIFICANT CHANGE UP (ref 3.8–10.5)

## 2020-02-26 RX ADMIN — Medication 20 MILLIGRAM(S): at 21:06

## 2020-02-26 RX ADMIN — Medication 20 MILLIGRAM(S): at 05:09

## 2020-02-26 RX ADMIN — Medication 20 MILLIGRAM(S): at 14:53

## 2020-02-26 NOTE — CHART NOTE - NSCHARTNOTEFT_GEN_A_CORE
Pt expressing desire to " eat real food" . Denies pain or loose stool today. No further rectal bleeding. Discussed with Dr. Rodriguez, advance diet. Continue to assess.

## 2020-02-27 ENCOUNTER — TRANSCRIPTION ENCOUNTER (OUTPATIENT)
Age: 25
End: 2020-02-27

## 2020-02-27 VITALS
SYSTOLIC BLOOD PRESSURE: 115 MMHG | OXYGEN SATURATION: 98 % | TEMPERATURE: 98 F | HEART RATE: 67 BPM | RESPIRATION RATE: 18 BRPM | DIASTOLIC BLOOD PRESSURE: 76 MMHG

## 2020-02-27 LAB
ANION GAP SERPL CALC-SCNC: 15 MMOL/L — SIGNIFICANT CHANGE UP (ref 5–17)
BUN SERPL-MCNC: 16 MG/DL — SIGNIFICANT CHANGE UP (ref 7–23)
CALCIUM SERPL-MCNC: 9.4 MG/DL — SIGNIFICANT CHANGE UP (ref 8.4–10.5)
CHLORIDE SERPL-SCNC: 101 MMOL/L — SIGNIFICANT CHANGE UP (ref 96–108)
CO2 SERPL-SCNC: 23 MMOL/L — SIGNIFICANT CHANGE UP (ref 22–31)
CREAT SERPL-MCNC: 0.74 MG/DL — SIGNIFICANT CHANGE UP (ref 0.5–1.3)
GLUCOSE SERPL-MCNC: 112 MG/DL — HIGH (ref 70–99)
HCT VFR BLD CALC: 38.5 % — SIGNIFICANT CHANGE UP (ref 34.5–45)
HGB BLD-MCNC: 12.6 G/DL — SIGNIFICANT CHANGE UP (ref 11.5–15.5)
MCHC RBC-ENTMCNC: 29 PG — SIGNIFICANT CHANGE UP (ref 27–34)
MCHC RBC-ENTMCNC: 32.7 GM/DL — SIGNIFICANT CHANGE UP (ref 32–36)
MCV RBC AUTO: 88.7 FL — SIGNIFICANT CHANGE UP (ref 80–100)
NRBC # BLD: 0 /100 WBCS — SIGNIFICANT CHANGE UP (ref 0–0)
PLATELET # BLD AUTO: 314 K/UL — SIGNIFICANT CHANGE UP (ref 150–400)
POTASSIUM SERPL-MCNC: 4.2 MMOL/L — SIGNIFICANT CHANGE UP (ref 3.5–5.3)
POTASSIUM SERPL-SCNC: 4.2 MMOL/L — SIGNIFICANT CHANGE UP (ref 3.5–5.3)
RBC # BLD: 4.34 M/UL — SIGNIFICANT CHANGE UP (ref 3.8–5.2)
RBC # FLD: 12.4 % — SIGNIFICANT CHANGE UP (ref 10.3–14.5)
SODIUM SERPL-SCNC: 139 MMOL/L — SIGNIFICANT CHANGE UP (ref 135–145)
WBC # BLD: 10.31 K/UL — SIGNIFICANT CHANGE UP (ref 3.8–10.5)
WBC # FLD AUTO: 10.31 K/UL — SIGNIFICANT CHANGE UP (ref 3.8–10.5)

## 2020-02-27 PROCEDURE — 80048 BASIC METABOLIC PNL TOTAL CA: CPT

## 2020-02-27 PROCEDURE — 86901 BLOOD TYPING SEROLOGIC RH(D): CPT

## 2020-02-27 PROCEDURE — 87507 IADNA-DNA/RNA PROBE TQ 12-25: CPT

## 2020-02-27 PROCEDURE — 74177 CT ABD & PELVIS W/CONTRAST: CPT

## 2020-02-27 PROCEDURE — 85610 PROTHROMBIN TIME: CPT

## 2020-02-27 PROCEDURE — 87449 NOS EACH ORGANISM AG IA: CPT

## 2020-02-27 PROCEDURE — 96361 HYDRATE IV INFUSION ADD-ON: CPT

## 2020-02-27 PROCEDURE — 85730 THROMBOPLASTIN TIME PARTIAL: CPT

## 2020-02-27 PROCEDURE — 84702 CHORIONIC GONADOTROPIN TEST: CPT

## 2020-02-27 PROCEDURE — 96360 HYDRATION IV INFUSION INIT: CPT

## 2020-02-27 PROCEDURE — 87324 CLOSTRIDIUM AG IA: CPT

## 2020-02-27 PROCEDURE — 99285 EMERGENCY DEPT VISIT HI MDM: CPT | Mod: 25

## 2020-02-27 PROCEDURE — 87046 STOOL CULTR AEROBIC BACT EA: CPT

## 2020-02-27 PROCEDURE — 87177 OVA AND PARASITES SMEARS: CPT

## 2020-02-27 PROCEDURE — 81001 URINALYSIS AUTO W/SCOPE: CPT

## 2020-02-27 PROCEDURE — 86850 RBC ANTIBODY SCREEN: CPT

## 2020-02-27 PROCEDURE — 83605 ASSAY OF LACTIC ACID: CPT

## 2020-02-27 PROCEDURE — 80053 COMPREHEN METABOLIC PANEL: CPT

## 2020-02-27 PROCEDURE — 88305 TISSUE EXAM BY PATHOLOGIST: CPT

## 2020-02-27 PROCEDURE — 87045 FECES CULTURE AEROBIC BACT: CPT

## 2020-02-27 PROCEDURE — 83690 ASSAY OF LIPASE: CPT

## 2020-02-27 PROCEDURE — 85652 RBC SED RATE AUTOMATED: CPT

## 2020-02-27 PROCEDURE — 85027 COMPLETE CBC AUTOMATED: CPT

## 2020-02-27 PROCEDURE — 86900 BLOOD TYPING SEROLOGIC ABO: CPT

## 2020-02-27 PROCEDURE — 86140 C-REACTIVE PROTEIN: CPT

## 2020-02-27 PROCEDURE — 87086 URINE CULTURE/COLONY COUNT: CPT

## 2020-02-27 RX ADMIN — Medication 20 MILLIGRAM(S): at 14:14

## 2020-02-27 RX ADMIN — Medication 20 MILLIGRAM(S): at 16:48

## 2020-02-27 RX ADMIN — Medication 20 MILLIGRAM(S): at 05:08

## 2020-02-27 NOTE — DISCHARGE NOTE PROVIDER - NSDCMRMEDTOKEN_GEN_ALL_CORE_FT
predniSONE 20 mg oral tablet: 2 tab(s) orally once a day   Vibra-Tabs 100 mg oral tablet: 1 tab(s) orally 2 times a day

## 2020-02-27 NOTE — DISCHARGE NOTE NURSING/CASE MANAGEMENT/SOCIAL WORK - PATIENT PORTAL LINK FT
You can access the FollowMyHealth Patient Portal offered by [facility] by registering at the following website: [email or web address]. By joining [employer]’s FollowMyHealth portal, you will also be able to view your health information using other applications (apps) compatible with our system.

## 2020-02-27 NOTE — PROGRESS NOTE ADULT - SUBJECTIVE AND OBJECTIVE BOX
CHIEF COMPLAINT:Patient is a 24y old  Female who presents with a chief complaint of rectal bleeding (24 Feb 2020 09:53)    	hpi reviewed        PAST MEDICAL & SURGICAL HISTORY:  Recurrent UTI (urinary tract infection)  H/O osteomyelitis: ?MRSA  History of ankle surgery    meds none  allergies bactrim  no tobacco or etoh           REVIEW OF SYSTEMS:  CONSTITUTIONAL: No fever, weight loss, or fatigue  EYES: No eye pain, visual disturbances, or discharge  NECK: No pain or stiffness  RESPIRATORY: No cough, wheezing, chills or hemoptysis; No Shortness of Breath  CARDIOVASCULAR: No chest pain, palpitations, passing out, dizziness, or leg swelling  GASTROINTESTINAL: abd pain  . bloody diarrhea   GENITOURINARY: No dysuria, frequency, hematuria, or incontinence  NEUROLOGICAL: No headaches, memory loss, loss of strength, numbness, or tremors  SKIN: No itching, burning, rashes, or lesions   MUSCULOSKELETAL: No joint pain or swelling; No muscle, back, or extremity pain    Medications:  MEDICATIONS  (STANDING):  sodium chloride 0.9%. 1000 milliLiter(s) (100 mL/Hr) IV Continuous <Continuous>    MEDICATIONS  (PRN):    	    PHYSICAL EXAM:  T(C): 37 (02-24-20 @ 06:11), Max: 37.1 (02-23-20 @ 20:15)  HR: 69 (02-24-20 @ 06:11) (66 - 97)  BP: 116/76 (02-24-20 @ 06:11) (116/75 - 125/82)  RR: 16 (02-24-20 @ 06:11) (16 - 18)  SpO2: 97% (02-24-20 @ 06:11) (97% - 100%)  Wt(kg): --  I&O's Summary      Appearance: Normal	  HEENT:   Normal oral mucosa, PERRL, EOMI	  Lymphatic: No lymphadenopathy  Cardiovascular: Normal S1 S2, No JVD, No murmurs, No edema  Respiratory: Lungs clear to auscultation	  Psychiatry: A & O x 3, Mood & affect appropriate  Gastrointestinal:  Soft, tender   Skin: No rashes, No ecchymoses, No cyanosis	  Neurologic: Non-focal  Extremities: Normal range of motion, No clubbing, cyanosis or edema  Vascular: Peripheral pulses palpable 2+ bilaterally    TELEMETRY: 	    ECG:  	  RADIOLOGY:  OTHER: 	  	  LABS:	 	    CARDIAC MARKERS:                                11.8   6.07  )-----------( 215      ( 24 Feb 2020 09:21 )             36.7     02-23    140  |  104  |  12  ----------------------------<  99  3.8   |  22  |  0.86    Ca    9.3      23 Feb 2020 17:41    TPro  7.2  /  Alb  4.1  /  TBili  0.2  /  DBili  x   /  AST  13  /  ALT  15  /  AlkPhos  65  02-23    proBNP:   Lipid Profile:   HgA1c:   TSH:
CHIEF COMPLAINT:Patient is a 24y old  Female who presents with a chief complaint of rectal bleeding (26 Feb 2020 15:09)    	        PAST MEDICAL & SURGICAL HISTORY:  Recurrent UTI (urinary tract infection)  H/O osteomyelitis: ?MRSA  History of ankle surgery          REVIEW OF SYSTEMS:  CONSTITUTIONAL: No fever, weight loss, or fatigue  EYES: No eye pain, visual disturbances, or discharge  NECK: No pain or stiffness  RESPIRATORY: No cough, wheezing, chills or hemoptysis; No Shortness of Breath  CARDIOVASCULAR: No chest pain, palpitations, passing out, dizziness, or leg swelling  GASTROINTESTINAL: abd pain better  no brbpr   GENITOURINARY: No dysuria, frequency, hematuria, or incontinence  NEUROLOGICAL: No headaches, memory loss, loss of strength, numbness, or tremors  SKIN: No itching, burning, rashes, or lesions   LYMPH Nodes: No enlarged glands  ENDOCRINE: No heat or cold intolerance; No hair loss  MUSCULOSKELETAL: No joint pain or swelling; No muscle, back, or extremity pain    Medications:  MEDICATIONS  (STANDING):  methylPREDNISolone sodium succinate Injectable 20 milliGRAM(s) IV Push every 8 hours    MEDICATIONS  (PRN):    	    PHYSICAL EXAM:  T(C): 36.7 (02-26-20 @ 11:32), Max: 36.7 (02-25-20 @ 20:17)  HR: 61 (02-26-20 @ 11:32) (61 - 68)  BP: 130/89 (02-26-20 @ 11:32) (100/61 - 130/89)  RR: 18 (02-26-20 @ 11:32) (18 - 18)  SpO2: 98% (02-26-20 @ 11:32) (96% - 100%)  Wt(kg): --  I&O's Summary    25 Feb 2020 07:01  -  26 Feb 2020 07:00  --------------------------------------------------------  IN: 760 mL / OUT: 0 mL / NET: 760 mL        Appearance: Normal	  HEENT:   Normal oral mucosa, PERRL, EOMI	  Lymphatic: No lymphadenopathy  Cardiovascular: Normal S1 S2, No JVD, No murmurs, No edema  Respiratory: Lungs clear to auscultation	  Psychiatry: A & O x 3, Mood & affect appropriate  Gastrointestinal:  Soft, Non-tender, + BS	  Skin: No rashes, No ecchymoses, No cyanosis	  Neurologic: Non-focal  Extremities: Normal range of motion, No clubbing, cyanosis or edema  Vascular: Peripheral pulses palpable 2+ bilaterally    TELEMETRY: 	    ECG:  	  RADIOLOGY:  OTHER: 	  	  LABS:	 	    CARDIAC MARKERS:                                11.7   7.66  )-----------( 285      ( 26 Feb 2020 05:38 )             36.2     02-26    139  |  104  |  12  ----------------------------<  134<H>  4.3   |  22  |  0.69    Ca    8.9      26 Feb 2020 05:38      proBNP:   Lipid Profile:   HgA1c:   TSH:
CHIEF COMPLAINT:Patient is a 24y old  Female who presents with a chief complaint of rectal bleeding (26 Feb 2020 15:44)    	        PAST MEDICAL & SURGICAL HISTORY:  Recurrent UTI (urinary tract infection)  H/O osteomyelitis: ?MRSA  History of ankle surgery          REVIEW OF SYSTEMS:  CONSTITUTIONAL: No fever, weight loss, or fatigue  EYES: No eye pain, visual disturbances, or discharge  NECK: No pain or stiffness  RESPIRATORY: No cough, wheezing, chills or hemoptysis; No Shortness of Breath  CARDIOVASCULAR: No chest pain, palpitations, passing out, dizziness, or leg swelling  GASTROINTESTINAL minimal abd discomfort  tolerating diet   GENITOURINARY: No dysuria, frequency, hematuria, or incontinence  NEUROLOGICAL: No headaches, memory loss, loss of strength, numbness, or tremors  SKIN: No itching, burning, rashes, or lesions   LYMPH Nodes: No enlarged glands  MUSCULOSKELETAL: No joint pain or swelling; No muscle, back, or extremity pain    Medications:  MEDICATIONS  (STANDING):  methylPREDNISolone sodium succinate Injectable 20 milliGRAM(s) IV Push every 8 hours    MEDICATIONS  (PRN):    	    PHYSICAL EXAM:  T(C): 36.6 (02-27-20 @ 04:36), Max: 36.7 (02-26-20 @ 11:32)  HR: 58 (02-27-20 @ 04:36) (58 - 63)  BP: 108/68 (02-27-20 @ 04:36) (108/68 - 130/89)  RR: 18 (02-27-20 @ 04:36) (18 - 18)  SpO2: 98% (02-27-20 @ 04:36) (98% - 98%)  Wt(kg): --  I&O's Summary      Appearance: Normal	  HEENT:   Normal oral mucosa, PERRL, EOMI	  Lymphatic: No lymphadenopathy  Cardiovascular: Normal S1 S2, No JVD, No murmurs, No edema  Respiratory: Lungs clear to auscultation	  Psychiatry: A & O x 3, Mood & affect appropriate  Gastrointestinal:  Soft, Non-tender, + BS	  Skin: No rashes, No ecchymoses, No cyanosis	  Neurologic: Non-focal  Extremities: Normal range of motion, No clubbing, cyanosis or edema  Vascular: Peripheral pulses palpable 2+ bilaterally    TELEMETRY: 	    ECG:  	  RADIOLOGY:  OTHER: 	  	  LABS:	 	    CARDIAC MARKERS:                                12.6   10.31 )-----------( 314      ( 27 Feb 2020 06:25 )             38.5     02-27    139  |  101  |  16  ----------------------------<  112<H>  4.2   |  23  |  0.74    Ca    9.4      27 Feb 2020 06:25      proBNP:   Lipid Profile:   HgA1c:   TSH:
INTERVAL HPI/OVERNIGHT EVENTS:    Patient was seen in the morning.  She was doing fine and had advanced her diet to low residue.  Had onset of abdominal pain 5/10 starting at 4 pm.  No NVFC.  Able to eat pasta for dinner at 8 pm.  No BM last night.    MEDICATIONS  (STANDING):  methylPREDNISolone sodium succinate Injectable 20 milliGRAM(s) IV Push every 8 hours    MEDICATIONS  (PRN):      Allergies    Bactrim (Rash)    Intolerances        Review of Systems:  No fever or chills.  No chest pain.  No blood per rectum.    Vital Signs Last 24 Hrs  T(C): 36.7 (25 Feb 2020 20:17), Max: 36.9 (25 Feb 2020 11:37)  T(F): 98.1 (25 Feb 2020 20:17), Max: 98.4 (25 Feb 2020 11:37)  HR: 68 (25 Feb 2020 20:17) (61 - 74)  BP: 119/76 (25 Feb 2020 20:17) (102/61 - 119/76)  BP(mean): --  RR: 18 (25 Feb 2020 20:17) (18 - 18)  SpO2: 100% (25 Feb 2020 20:17) (98% - 100%)    PHYSICAL EXAM:  Constitutional: NAD, well-developed  Neck: No LAD, supple  Respiratory: CTAB  Cardiovascular: S1 and S2, RRR,   Gastrointestinal: BS+, soft, NT/ND, neg HSM,  Extremities: No peripheral edema, neg clubing, cyanosis  Vascular: 2+ peripheral pulses  Neurological: A/O x 3, no focal deficits  Psychiatric: Normal mood, normal affect  Skin: No rashes    LABS:                        13.0   6.21  )-----------( 285      ( 25 Feb 2020 18:01 )             39.3     02-25    137  |  106  |  5<L>  ----------------------------<  118<H>  4.1   |  21<L>  |  0.66    Ca    8.4      25 Feb 2020 06:26          LIVER FUNCTIONS - ( 23 Feb 2020 17:41 )  Alb: 4.1 g/dL / Pro: 7.2 g/dL / ALK PHOS: 65 U/L / ALT: 15 U/L / AST: 13 U/L / GGT: x             RADIOLOGY & ADDITIONAL TESTS:
SUBJECTIVE: Abdominal pain persisted overnight but has improved as of this afternoon. She is tolerating clear liquids without issue. She does feel bloated and admits to not having a bowel movement since before the colonoscopy. However she is passing gas. She remains afebrile. No nausea or emesis.     MEDICATIONS  (STANDING):  methylPREDNISolone sodium succinate Injectable 20 milliGRAM(s) IV Push every 8 hours    OBJECTIVE:  Vital Signs Last 24 Hrs  T(C): 36.7 (26 Feb 2020 11:32), Max: 36.7 (25 Feb 2020 20:17)  T(F): 98.1 (26 Feb 2020 11:32), Max: 98.1 (25 Feb 2020 20:17)  HR: 61 (26 Feb 2020 11:32) (61 - 68)  BP: 130/89 (26 Feb 2020 11:32) (100/61 - 130/89)  BP(mean): --  RR: 18 (26 Feb 2020 11:32) (18 - 18)  SpO2: 98% (26 Feb 2020 11:32) (96% - 100%)    PHYSICAL EXAM:  Constitutional: A&OX3, in NAD,   HEENT: NCAT, no scleral icterus,  Gastrointestinal: softly distended with hyperactive bowel sounds, trace lower abdominal tenderness, mild tympany superiorly.   Extremities: No peripheral edema b/l     LABS:                        11.7   7.66  )-----------( 285      ( 26 Feb 2020 05:38 )             36.2     02-26    139  |  104  |  12  ----------------------------<  134<H>  4.3   |  22  |  0.69    Ca    8.9      26 Feb 2020 05:38    LIVER FUNCTIONS - ( 23 Feb 2020 17:41 )  Alb: 4.1 g/dL / Pro: 7.2 g/dL / ALK PHOS: 65 U/L / ALT: 15 U/L / AST: 13 U/L / GGT: x             RADIOLOGY & ADDITIONAL TESTS:
CHIEF COMPLAINT:Patient is a 24y old  Female who presents with a chief complaint of rectal bleeding (24 Feb 2020 11:52)    	        PAST MEDICAL & SURGICAL HISTORY:  Recurrent UTI (urinary tract infection)  H/O osteomyelitis: ?MRSA  History of ankle surgery          REVIEW OF SYSTEMS:  CONSTITUTIONAL: No fever, weight loss, or fatigue  EYES: No eye pain, visual disturbances, or discharge  NECK: No pain or stiffness  RESPIRATORY: No cough, wheezing, chills or hemoptysis; No Shortness of Breath  CARDIOVASCULAR: No chest pain, palpitations, passing out, dizziness, or leg swelling  GASTROINTESTINAL: abd pain better no bloody bms today  GENITOURINARY: No dysuria, frequency, hematuria, or incontinence  NEUROLOGICAL: No headaches, memory loss, loss of strength, numbness, or tremors    MUSCULOSKELETAL: No joint pain or swelling; No muscle, back, or extremity pain    Medications:  MEDICATIONS  (STANDING):  methylPREDNISolone sodium succinate Injectable 20 milliGRAM(s) IV Push every 8 hours  sodium chloride 0.9%. 1000 milliLiter(s) (100 mL/Hr) IV Continuous <Continuous>    MEDICATIONS  (PRN):    	    PHYSICAL EXAM:  T(C): 36.5 (02-25-20 @ 05:10), Max: 37.1 (02-24-20 @ 20:27)  HR: 61 (02-25-20 @ 05:10) (61 - 78)  BP: 102/61 (02-25-20 @ 05:10) (102/61 - 122/79)  RR: 18 (02-25-20 @ 05:10) (18 - 18)  SpO2: 98% (02-25-20 @ 05:10) (98% - 100%)  Wt(kg): --  I&O's Summary    24 Feb 2020 07:01  -  25 Feb 2020 07:00  --------------------------------------------------------  IN: 400 mL / OUT: 1 mL / NET: 399 mL        Appearance: Normal	  HEENT:   Normal oral mucosa, PERRL, EOMI	  Lymphatic: No lymphadenopathy  Cardiovascular: Normal S1 S2, No JVD, No murmurs, No edema  Respiratory: Lungs clear to auscultation	  Psychiatry: A & O x 3, Mood & affect appropriate  Gastrointestinal:  Soft, Non-tender, + BS	  Skin: No rashes, No ecchymoses, No cyanosis	  Neurologic: Non-focal  Extremities: Normal range of motion, No clubbing, cyanosis or edema  Vascular: Peripheral pulses palpable 2+ bilaterally    TELEMETRY: 	    ECG:  	  RADIOLOGY:  OTHER: 	  	  LABS:	 	    CARDIAC MARKERS:                                11.9   3.29  )-----------( 235      ( 25 Feb 2020 06:29 )             37.9     02-25    137  |  106  |  5<L>  ----------------------------<  118<H>  4.1   |  21<L>  |  0.66    Ca    8.4      25 Feb 2020 06:26    TPro  7.2  /  Alb  4.1  /  TBili  0.2  /  DBili  x   /  AST  13  /  ALT  15  /  AlkPhos  65  02-23    proBNP:   Lipid Profile:   HgA1c:   TSH:

## 2020-02-27 NOTE — DISCHARGE NOTE PROVIDER - NSDCCPCAREPLAN_GEN_ALL_CORE_FT
PRINCIPAL DISCHARGE DIAGNOSIS  Diagnosis: Rectal bleeding  Assessment and Plan of Treatment: pancolitis. Take prednisone 40 mg daily and see Dr Rodriguez prior to 1 week for follow up . Follow up with PCP in a few days. Return to the hospital if worsens      SECONDARY DISCHARGE DIAGNOSES  Diagnosis: Pancolitis  Assessment and Plan of Treatment: Pancolitis follow up with GI for remaing results
no

## 2020-02-27 NOTE — PROGRESS NOTE ADULT - ASSESSMENT
25 yo female with PMH of osteomyelitis (?MRSA) of left ankle, presents here with one month of rectal bleeding        ·  Problem: Rectal bleeding.  Plan: Patient with rectal bleeding >10 episodes/day  colitis   likely ulc colitis     iv steroids   Hb stable  monitor H+H  clears       ·  Problem: Pancolitis.  Plan: Given evidence of pancolitis, >10 episodes of diarrhea and fecal urgency,             ·  Problem: Prophylactic measure.  Plan: no AC given bleeding risk.       discussed w. gi
23 yo female with PMH of osteomyelitis (?MRSA) of left ankle, presents here with one month of rectal bleeding        ·  Problem: Rectal bleeding.  Plan: Patient with rectal bleeding >10 episodes/day  colitis   likely ulc colitis     iv steroids ? change to po   will discuss w/ gi   Hb stable    tolerating diet       ·  Problem: Pancolitis. /u c             discussed w. gi     d/c likely later today
24 year old female with pancolitis- biopsies pending. Infectious etiology ruled out. She is experiencing mild abdominal distention in the setting of not moving her bowels since the colonoscopy which may be the source of her intermittent abdominal pain.   -Continue IV steroids.  -Advance to low residue diet as tolerated  -continue to monitor for flatus and for bowel movement  -if worsening distention or abdominal pain low threshold to check abdominal xray   Above discussed with Dr. Rodriguez
25 yo female with PMH of osteomyelitis (?MRSA) of left ankle, presents here with one month of rectal bleeding        ·  Problem: Rectal bleeding.  Plan: Patient with rectal bleeding >10 episodes/day  colitis   likely ulc colitis     iv steroids   Hb stable  monitor H+H  adv diet as per gi       ·  Problem: Pancolitis.  Plan: Given evidence of pancolitis, >10 episodes of diarrhea and fecal urgency, now resolved            ·  Problem: Prophylactic measure.  Plan: no AC given bleeding risk.       discussed w. gi
25 yo female with PMH of osteomyelitis (?MRSA) of left ankle, presents here with one month of rectal bleeding        ·  Problem: Rectal bleeding.  Plan: Patient with rectal bleeding >10 episodes/day  colitis   r/o UC  sig today  prep as per gi  iv fluids   Hb stable  monitor H+H  keep NPO       ·  Problem: Pancolitis.  Plan: Given evidence of pancolitis, >10 episodes of diarrhea and fecal urgency, will start patient on IV abx for now            ·  Problem: Prophylactic measure.  Plan: no AC given bleeding risk.       discussed wJose gi
In summary, young female with pan colitis, neg stool cultures, colonoscopy with pancolitis s/p biopsy (results pending) doing ok but after solid food, having pain this evening.    Discussed to change back to clear liquids for now and monitor.  Continue IV steroids.  CHeck labs in AM.    Alfonso Rodriguez MD

## 2020-02-27 NOTE — DISCHARGE NOTE PROVIDER - HOSPITAL COURSE
25 yo female with PMH of osteomyelitis (?MRSA) of left ankle, presents here with one month of rectal bleeding.  Patient states about 1 month ago, patient started having dark stook and 2 weeks ago started having BRBPR.  Patient states she has been having multiple episodes of bloody stool, recently she has been having fecal urgency and running to bathroom every hour, on average having >10 episodes of bloody stool per day.  She also has associated with abdominal pain but denies fever, chills, nausea, vomiting.  She has been feeling hot flushes.  She is also currently being treated for UTI with nitrofurontoin (completed 3 days).  Patient's father and paternal grandmother were diagnosed with ulcerative colitis.  Patient admitted    GI consulted:     pan colitis, neg stool cultures, colonoscopy with pancolitis s/p biopsy (results pending) doing better but after solid food, started having pain. Patient monitored overnight.     Pt changed back to clear liquids for now and monitor.  IV steroids.    Patient had normal BM, tolerating advanced diet, feels better. Patient cleared for discharge home on Prednisone 40 mg po daily for a week and see GI Dr Rodriguez for continued care. Patient will also follow up with PCP .

## 2020-02-27 NOTE — DISCHARGE NOTE PROVIDER - CARE PROVIDER_API CALL
Alfonso Rodriguez)  Gastroenterology  [address], [address]  [place] [address]  Phone: [phone]  Fax: [phone]  Follow Up Time:     Dr Chicas, PCP,   Phone: (   )    -  Fax: (   )    -  Follow Up Time: 1-3 days

## 2020-02-27 NOTE — DISCHARGE NOTE PROVIDER - PROVIDER TOKENS
PROVIDER:[TOKEN:[[id number]]],FREE:[LAST:[Dr Chicas, PCP],PHONE:[(   )    -],FAX:[(   )    -],FOLLOWUP:[1-3 days]]

## 2020-11-16 ENCOUNTER — TRANSCRIPTION ENCOUNTER (OUTPATIENT)
Age: 25
End: 2020-11-16

## 2020-11-17 NOTE — PATIENT PROFILE ADULT - VISION (WITH CORRECTIVE LENSES IF THE PATIENT USUALLY WEARS THEM):
There is a possibility you have COVID-19.  Return immediately for high fevers trouble breathing or any new or worsening symptoms.  Quarantine at home.  Your results should be available on [facility] My[facility] within 1 to 2 days  
Normal vision: sees adequately in most situations; can see medication labels, newsprint

## 2021-09-29 ENCOUNTER — APPOINTMENT (OUTPATIENT)
Dept: OBGYN | Facility: CLINIC | Age: 26
End: 2021-09-29
Payer: COMMERCIAL

## 2021-09-29 ENCOUNTER — NON-APPOINTMENT (OUTPATIENT)
Age: 26
End: 2021-09-29

## 2021-09-29 VITALS
DIASTOLIC BLOOD PRESSURE: 60 MMHG | SYSTOLIC BLOOD PRESSURE: 112 MMHG | WEIGHT: 122 LBS | HEIGHT: 64 IN | BODY MASS INDEX: 20.83 KG/M2

## 2021-09-29 DIAGNOSIS — Z78.9 OTHER SPECIFIED HEALTH STATUS: ICD-10-CM

## 2021-09-29 DIAGNOSIS — Z01.419 ENCOUNTER FOR GYNECOLOGICAL EXAMINATION (GENERAL) (ROUTINE) W/OUT ABNORMAL FINDINGS: ICD-10-CM

## 2021-09-29 PROCEDURE — 99385 PREV VISIT NEW AGE 18-39: CPT

## 2021-09-29 PROCEDURE — 81025 URINE PREGNANCY TEST: CPT

## 2021-09-29 RX ORDER — NORGESTIMATE AND ETHINYL ESTRADIOL 7DAYSX3 28
0.18/0.215/0.25 KIT ORAL DAILY
Qty: 3 | Refills: 0 | Status: DISCONTINUED | COMMUNITY
Start: 2019-07-11 | End: 2021-09-29

## 2021-09-30 PROBLEM — Z78.9 SOCIAL ALCOHOL USE: Status: ACTIVE | Noted: 2021-09-29

## 2021-09-30 LAB
C TRACH RRNA SPEC QL NAA+PROBE: NOT DETECTED
HCG UR QL: NEGATIVE
HPV HIGH+LOW RISK DNA PNL CVX: DETECTED
N GONORRHOEA RRNA SPEC QL NAA+PROBE: NOT DETECTED
QUALITY CONTROL: YES
SOURCE TP AMPLIFICATION: NORMAL

## 2021-09-30 RX ORDER — NORGESTIMATE AND ETHINYL ESTRADIOL 0.25-0.035
0.25-35 KIT ORAL
Refills: 0 | Status: ACTIVE | COMMUNITY

## 2021-09-30 NOTE — DISCUSSION/SUMMARY
[FreeTextEntry1] : Pap done today. GC/CH collected. STD serum testing ordered. \par \par Refill prescription for Tri-Femynor 0.18/0.215.0.25 mg OCPs sent to pharmacy. \par \par Self-breast exam reviewed. \par \par She will follow up annually and as needed.\par

## 2021-10-05 LAB — CYTOLOGY CVX/VAG DOC THIN PREP: NORMAL

## 2021-10-21 ENCOUNTER — NON-APPOINTMENT (OUTPATIENT)
Age: 26
End: 2021-10-21

## 2022-10-20 ENCOUNTER — APPOINTMENT (OUTPATIENT)
Dept: OBGYN | Facility: CLINIC | Age: 27
End: 2022-10-20

## 2022-10-20 VITALS
SYSTOLIC BLOOD PRESSURE: 104 MMHG | HEIGHT: 64 IN | WEIGHT: 110 LBS | BODY MASS INDEX: 18.78 KG/M2 | DIASTOLIC BLOOD PRESSURE: 58 MMHG

## 2022-10-20 DIAGNOSIS — Z11.3 ENCOUNTER FOR SCREENING FOR INFECTIONS WITH A PREDOMINANTLY SEXUAL MODE OF TRANSMISSION: ICD-10-CM

## 2022-10-20 DIAGNOSIS — Z11.59 ENCOUNTER FOR SCREENING FOR OTHER VIRAL DISEASES: ICD-10-CM

## 2022-10-20 DIAGNOSIS — Z30.9 ENCOUNTER FOR CONTRACEPTIVE MANAGEMENT, UNSPECIFIED: ICD-10-CM

## 2022-10-20 DIAGNOSIS — Z01.419 ENCOUNTER FOR GYNECOLOGICAL EXAMINATION (GENERAL) (ROUTINE) W/OUT ABNORMAL FINDINGS: ICD-10-CM

## 2022-10-20 DIAGNOSIS — Z11.8 ENCOUNTER FOR SCREENING FOR OTHER INFECTIOUS AND PARASITIC DISEASES: ICD-10-CM

## 2022-10-20 DIAGNOSIS — Z11.4 ENCOUNTER FOR SCREENING FOR HUMAN IMMUNODEFICIENCY VIRUS [HIV]: ICD-10-CM

## 2022-10-20 PROCEDURE — 36415 COLL VENOUS BLD VENIPUNCTURE: CPT

## 2022-10-20 PROCEDURE — 99395 PREV VISIT EST AGE 18-39: CPT

## 2022-10-20 RX ORDER — NORGESTIMATE AND ETHINYL ESTRADIOL 7DAYSX3 28
0.18/0.215/0.25 KIT ORAL
Qty: 3 | Refills: 0 | Status: DISCONTINUED | COMMUNITY
Start: 2021-09-29 | End: 2022-10-20

## 2022-10-22 LAB
C TRACH RRNA SPEC QL NAA+PROBE: NOT DETECTED
HAV IGM SER QL: NONREACTIVE
HBV CORE IGM SER QL: NONREACTIVE
HBV SURFACE AG SER QL: NONREACTIVE
HCV AB SER QL: NONREACTIVE
HCV S/CO RATIO: 0.17 S/CO
HIV1+2 AB SPEC QL IA.RAPID: NONREACTIVE
HPV HIGH+LOW RISK DNA PNL CVX: NOT DETECTED
N GONORRHOEA RRNA SPEC QL NAA+PROBE: NOT DETECTED
SOURCE AMPLIFICATION: NORMAL
SOURCE TP AMPLIFICATION: NORMAL
T PALLIDUM AB SER QL IA: NEGATIVE
T VAGINALIS RRNA SPEC QL NAA+PROBE: NOT DETECTED

## 2022-10-23 PROBLEM — Z11.3 SCREEN FOR STD (SEXUALLY TRANSMITTED DISEASE): Status: RESOLVED | Noted: 2021-09-29 | Resolved: 2022-10-23

## 2022-10-23 NOTE — PHYSICAL EXAM
[Appropriately responsive] : appropriately responsive [Alert] : alert [No Acute Distress] : no acute distress [Soft] : soft [Non-tender] : non-tender [Non-distended] : non-distended [Oriented x3] : oriented x3 [Examination Of The Breasts] : a normal appearance [No Discharge] : no discharge [No Masses] : no breast masses were palpable [Labia Minora] : normal [Labia Majora] : normal [No Bleeding] : There was no active vaginal bleeding [Normal] : normal [Uterine Adnexae] : normal [Chaperone Present] : A chaperone was present in the examining room during all aspects of the physical examination [FreeTextEntry1] : MOA: Merry ARMSTRONG

## 2022-10-23 NOTE — END OF VISIT
[FreeTextEntry3] : I, Marcel Falk solely acted as scribe for Dr. Patricia Joe on 10/20/2022 \par All medical entries made by the Scribe were at my, Dr. Joe’s, direction and personally\par dictated by me on 10/20/2022 . I have reviewed the chart and agree that the record\par accurately reflects my personal performance of the history, physical exam, assessment and plan. I\par have also personally directed, reviewed, and agreed with the chart.

## 2022-10-23 NOTE — HISTORY OF PRESENT ILLNESS
[Gonorrhea test offered] : Gonorrhea test offered [Chlamydia test offered] : Chlamydia test offered [HPV test offered] : HPV test offered [Oral Contraceptive] : uses oral contraception pills [Y] : Patient is sexually active [N] : Patient denies prior pregnancies [Menarche Age: ____] : age at menarche was [unfilled] [Currently Active] : currently active [Men] : men [PapSmeardate] : 09/29/2021 [TextBox_31] : Negative [ColonoscopyDate] : 02/24/2020 [GonorrheaDate] : 09/29/2021 [TextBox_63] : Negative [ChlamydiaDate] : 09/29/2021 [TextBox_68] : Negative [HPVDate] : 09/29/2021 [TextBox_78] : Positive [LMPDate] : 10/11/2022 [FreeTextEntry1] : 10/11/2022

## 2022-10-23 NOTE — DISCUSSION/SUMMARY
[FreeTextEntry1] : Benign breast and pelvic exam. Pap done today.\par \par Contraceptive options were discussed including OCPs, NuvaRing, and IUD. R/b/a were discussed. Risks discussed include but are not limited to headache, mood swings, breast tenderness, weight gain, menstrual cycle changes, acne, ovarian cysts, and blood clots. Mirena and Kyleena IUD pamphlets given. \par \par Prescription for birth control pills were electronically sent to the pharmacy. She has no contraindications to birth control pill use. Will decide at a later date whether she would like the IUD for contraception management. Instructions on pill use, precautions, and side effects were discussed in detail. She is aware that the birth control pill is not perfect for preventing pregnancy even it she is compliant with taking the pill and therefore she needs condoms for back up. She was also made aware that the birth control pill does not protect her against sexual transmitted diseases and she still requires condom use. Questions answered.\par \par Self-breast exam reviewed.\par \par STI blood work collected today in office. \par \par She will follow up annually and as needed.

## 2022-10-24 ENCOUNTER — NON-APPOINTMENT (OUTPATIENT)
Age: 27
End: 2022-10-24

## 2022-10-31 LAB — CYTOLOGY CVX/VAG DOC THIN PREP: NORMAL

## 2022-11-16 ENCOUNTER — APPOINTMENT (OUTPATIENT)
Dept: ANTEPARTUM | Facility: CLINIC | Age: 27
End: 2022-11-16

## 2022-11-16 ENCOUNTER — APPOINTMENT (OUTPATIENT)
Dept: OBGYN | Facility: CLINIC | Age: 27
End: 2022-11-16

## 2023-02-24 NOTE — H&P ADULT - ATTENDING COMMENTS
Patient assigned to me by night hospitalist in charge for management and care for patient for this evening only. Care to be resumed by day hospitalist (Dr. Campbell) in the morning and thereafter.     Patient's care rendered on 2/23/2020 at 10PM.      I was physically present for the key portions of the evaluation and management (E/M) service provided.  I agree with the above history, physical, and plan which I have reviewed and edited where appropriate.     Plan discussed with Patient, RN, family.
Unremarkable

## 2023-09-14 ENCOUNTER — NON-APPOINTMENT (OUTPATIENT)
Age: 28
End: 2023-09-14

## 2023-11-10 RX ORDER — NITROFURANTOIN MACROCRYSTAL 50 MG
1 CAPSULE ORAL
Qty: 0 | Refills: 0 | DISCHARGE

## 2023-11-15 PROBLEM — Z87.39 PERSONAL HISTORY OF OTHER DISEASES OF THE MUSCULOSKELETAL SYSTEM AND CONNECTIVE TISSUE: Chronic | Status: ACTIVE | Noted: 2020-02-23

## 2023-11-15 PROBLEM — N39.0 URINARY TRACT INFECTION, SITE NOT SPECIFIED: Chronic | Status: ACTIVE | Noted: 2020-02-23

## 2023-12-05 ENCOUNTER — APPOINTMENT (OUTPATIENT)
Dept: OBGYN | Facility: CLINIC | Age: 28
End: 2023-12-05

## 2023-12-29 ENCOUNTER — APPOINTMENT (OUTPATIENT)
Dept: OBGYN | Facility: CLINIC | Age: 28
End: 2023-12-29

## 2023-12-30 ENCOUNTER — RX RENEWAL (OUTPATIENT)
Age: 28
End: 2023-12-30

## 2023-12-30 ENCOUNTER — RX CHANGE (OUTPATIENT)
Age: 28
End: 2023-12-30

## 2023-12-30 RX ORDER — NORGESTIMATE AND ETHINYL ESTRADIOL 0.25-0.035
0.25-35 KIT ORAL
Qty: 84 | Refills: 1 | Status: ACTIVE | COMMUNITY
Start: 2023-12-30 | End: 1900-01-01

## 2023-12-30 RX ORDER — NORGESTIMATE AND ETHINYL ESTRADIOL 0.25-0.035
0.25-35 KIT ORAL DAILY
Qty: 28 | Refills: 0 | Status: DISCONTINUED | COMMUNITY
Start: 2022-10-20 | End: 2023-12-30

## 2025-01-22 NOTE — HISTORY OF PRESENT ILLNESS
[Oral Contraceptive] : uses oral contraception pills [Y] : Patient is sexually active [N] : Patient denies prior pregnancies [Menarche Age: ____] : age at menarche was [unfilled] [Currently Active] : currently active [PapSmeardate] : 7/21/20 [TextBox_31] : NEG [LMPDate] : 9/20/21 [PGHxTotal] : 0 [FreeTextEntry1] : 9/20/21 Stable